# Patient Record
Sex: MALE | Race: OTHER | HISPANIC OR LATINO | URBAN - METROPOLITAN AREA
[De-identification: names, ages, dates, MRNs, and addresses within clinical notes are randomized per-mention and may not be internally consistent; named-entity substitution may affect disease eponyms.]

---

## 2021-03-14 ENCOUNTER — EMERGENCY (EMERGENCY)
Facility: HOSPITAL | Age: 86
LOS: 1 days | Discharge: LEFT BEFORE DISCHARGE | End: 2021-03-14
Attending: STUDENT IN AN ORGANIZED HEALTH CARE EDUCATION/TRAINING PROGRAM
Payer: MEDICAID

## 2021-03-14 VITALS
SYSTOLIC BLOOD PRESSURE: 150 MMHG | OXYGEN SATURATION: 94 % | HEART RATE: 91 BPM | HEIGHT: 60 IN | TEMPERATURE: 98 F | RESPIRATION RATE: 26 BRPM | DIASTOLIC BLOOD PRESSURE: 80 MMHG | WEIGHT: 184.97 LBS

## 2021-03-14 PROCEDURE — 99282 EMERGENCY DEPT VISIT SF MDM: CPT

## 2021-03-14 NOTE — ED ADULT TRIAGE NOTE - CHIEF COMPLAINT QUOTE
s/r removal from scalp, also broken ribs after fall 1 week ago, seen in [place] for same    son-in-law Monica Ville [phone]

## 2021-03-14 NOTE — ED PROVIDER NOTE - PROGRESS NOTE DETAILS
Patient's son and caregiver does not want him to the stay in the hospital or receive further evaluation, he is aware that patient's condition could deteriorate secondary to rib fractures and patient's hypoxia including PNA and death. David Johnson,  PGY2

## 2021-03-14 NOTE — ED PROVIDER NOTE - ATTENDING CONTRIBUTION TO CARE
87 year old male presented to ED with son in law for suture removal. Per son in law at bedside, pt fell off a ladder in [place] and sustained scalp laceration and rib fractures approximately 2 weeks ago and recently moved to [place]. On exam, pt appeared to be tachypneic with bilateral suprasternal retractions. Son in law states pt has been a heavy smoker all his life and takes albuterol occasionally for breathing problems. Offered further evaluation and possible hospital admission, however both son in law and pt both refused. Requesting to stay and wants to leave after suture removal. Plan: Suture removal. Follow up with PCP

## 2021-03-14 NOTE — ED PROVIDER NOTE - PHYSICAL EXAMINATION
GENERAL: Awake, alert, NAD  HEENT: NC/AT, moist mucous membranes  LUNGS: +lung retractions, lungs CTA  CARDIAC: RRR, no m/r/g  ABDOMEN: Soft, normal BS, non tender, non distended, no rebound, no guarding  BACK: No midline spinal tenderness, no CVA tenderness  EXT: No edema, no calf tenderness, 2+ DP pulses bilaterally, no deformities.  NEURO: Confused, moving all extremities.  SKIN: Warm and dry. No rash.  PSYCH: Normal affect.

## 2021-03-14 NOTE — ED ADULT NURSE NOTE - EXPLANATION OF PATIENT'S REASON FOR LEAVING
The son spoke to the MD stating he does not want his dad admitted to the hospital or to have any further work up just his sutures removed

## 2021-03-14 NOTE — ED ADULT NURSE NOTE - CHIEF COMPLAINT QUOTE
s/r removal from scalp, also broken ribs after fall 1 week ago, seen in [place] for same    son-in-law Phyllis Ville [phone]

## 2021-03-14 NOTE — ED PROVIDER NOTE - CLINICAL SUMMARY MEDICAL DECISION MAKING FREE TEXT BOX
86 y/o male presenting to the ED s/p fall for suture removal. Patient hypoxic to 94% on RA with some retractions on exam. Family does not want patient to stay in the hospital or receive further work up. AMA form filled out. Will remove sutures. Return precautions provided. David Johnson, DO PGY2

## 2021-03-14 NOTE — ED PROVIDER NOTE - OBJECTIVE STATEMENT
88 y/o male presenting to the ED for suture removal for scalp laceration. Reportedly fell off a ladder in [place] last week and was hospitalized on the trauma service there for rib fractures. Family sent patient to [place] as they could no longer care for him. He is currently living with family although they are trying to pursue further assistive options. Son does not want patient to stay in the hospital or receive further work up including imaging.

## 2021-03-14 NOTE — ED PROVIDER NOTE - NSFOLLOWUPINSTRUCTIONS_ED_ALL_ED_FT
SEEK IMMEDIATE MEDICAL CARE IF YOU HAVE ANY OF THE FOLLOWING SYMPTOMS: worsening shortness of breath, chest pain, back pain, abdominal pain, fever, coughing up blood, lightheadedness/dizziness.

## 2021-03-14 NOTE — ED PROVIDER NOTE - PATIENT PORTAL LINK FT
You can access the FollowMyHealth Patient Portal offered by [facility] by registering at the following website: [email or web address]. By joining [employer]’s FollowMyHealth portal, you will also be able to view your health information using other applications (apps) compatible with our system.

## 2021-03-14 NOTE — ED ADULT NURSE NOTE - OBJECTIVE STATEMENT
87 year old male coming in for suture removal from a scalp laceration. As per son who is at bedside the patient feel off a ladder and was treated at a hospital in [place]. He was found to have a rib fracture. He was then brought to [place] to live with his family, because he could not live by himself. When he got to [place] his family wanted to have he reevaluated from the fall, how ever does not want imaging or to be admitted. Pt is alert and oriented able to ambulate independently. Pt is neurologically at baseline and intact. Pt breathing is shallow 2/2 to rub fracture pain and patient is requiring oxygen for low o2 saturation.
